# Patient Record
Sex: FEMALE | URBAN - METROPOLITAN AREA
[De-identification: names, ages, dates, MRNs, and addresses within clinical notes are randomized per-mention and may not be internally consistent; named-entity substitution may affect disease eponyms.]

---

## 2017-07-22 ENCOUNTER — IMPORTED ENCOUNTER (OUTPATIENT)
Dept: URBAN - METROPOLITAN AREA CLINIC 1 | Facility: CLINIC | Age: 32
End: 2017-07-22

## 2017-07-22 PROBLEM — Z79.84: Noted: 2017-07-22

## 2017-07-22 PROBLEM — H40.1132: Noted: 2017-07-22

## 2017-07-22 PROBLEM — E11.9: Noted: 2017-07-22

## 2017-07-22 PROBLEM — H25.813: Noted: 2017-07-22

## 2017-07-22 PROCEDURE — 92014 COMPRE OPH EXAM EST PT 1/>: CPT

## 2017-07-22 NOTE — PATIENT DISCUSSION
1.  DM Type II (Oral Meds) without sign of diabetic retinopathy and no blot heme on dilated retinal examination today OU No Macular Edema:  Discussed the pathophysiology of diabetes and its effect on the eye and risk of blindness. Stressed the importance of strong glucose control. Advised of importance of at least yearly dilated examinations but to contact us immediately for any problems or concerns. 2. Cataract OU: Observe for now without intervention. The patient was advised to contact us if any change or worsening of vision3. Moderate Open Angle Glaucoma OU (0.80/0.75) - IOP stable. Cont Travatan Z QHS OU. OCT again unable today. Patient to continue with current gtt regimen. Patient advised to be compliant with gtts. Letter to PCP Return for an appointment in 6 mo 10 with Dr. Kati Wilson.

## 2018-01-20 ENCOUNTER — IMPORTED ENCOUNTER (OUTPATIENT)
Dept: URBAN - METROPOLITAN AREA CLINIC 1 | Facility: CLINIC | Age: 33
End: 2018-01-20

## 2018-01-20 PROBLEM — H25.813: Noted: 2018-01-20

## 2018-01-20 PROBLEM — E11.9: Noted: 2018-01-20

## 2018-01-20 PROBLEM — Z79.84: Noted: 2018-01-20

## 2018-01-20 PROBLEM — H40.1132: Noted: 2018-01-20

## 2018-01-20 PROCEDURE — 92012 INTRM OPH EXAM EST PATIENT: CPT

## 2018-01-20 NOTE — PATIENT DISCUSSION
1.  Moderate Open Angle Glaucoma OU (0.80/0.75) - Increased IOP OU w/ non compliance. Risk of blindness w/ non compliance discussed w/ pt pt expressed understanding. If non compliance still an issue at next visit consider surgical options. Restart Travatan Z QHS OU (sample given.) Patient to continue with current gtt regimen. Patient advised to be compliant with gtts. 2. Cataract OU: Observe for now without intervention. The patient was advised to contact us if any change or worsening of vision3. H/o DM w/o DR Lady Zamora. Return for an appointment for a 30/OCT in June with Dr. Kiel Sierra.

## 2018-01-20 NOTE — PATIENT DISCUSSION
Moderate Open Angle Glaucoma OU -Patient to continue with current gtt regimen. Patient advised to be compliant with gtts. Condition was discussed with patient and patient understands. Will continue to monitor patient for any progression in condition. Patient was advised to call us with any problems questions or concerns.

## 2018-07-26 ENCOUNTER — IMPORTED ENCOUNTER (OUTPATIENT)
Dept: URBAN - METROPOLITAN AREA CLINIC 1 | Facility: CLINIC | Age: 33
End: 2018-07-26

## 2018-07-26 PROBLEM — E11.9: Noted: 2018-07-26

## 2018-07-26 PROBLEM — Z79.84: Noted: 2018-07-26

## 2018-07-26 PROBLEM — H40.1132: Noted: 2018-07-26

## 2018-07-26 PROBLEM — H25.813: Noted: 2018-07-26

## 2018-07-26 PROCEDURE — 92015 DETERMINE REFRACTIVE STATE: CPT

## 2018-07-26 PROCEDURE — 92014 COMPRE OPH EXAM EST PT 1/>: CPT

## 2018-07-26 PROCEDURE — 92133 CPTRZD OPH DX IMG PST SGM ON: CPT

## 2018-07-26 NOTE — PATIENT DISCUSSION
1.  DM Type II (Oral Med) without sign of diabetic retinopathy and no blot heme on dilated retinal examination today OU No Macular Edema:  Discussed the pathophysiology of diabetes and its effect on the eye and risk of blindness. Stressed the importance of strong glucose control. Advised of importance of at least yearly dilated examinations but to contact us immediately for any problems or concerns. 2. Moderate Open Angle Glaucoma OU (0.80/0.7) - IOP stable with compliance. OCT shows slight progression OU but testing today is on newer machine. Cont Travatan Z QHS OU (Sample given x1 refill sent to patient's pharm) 3. Cataract OU: Observe for now without intervention. The patient was advised to contact us if any change or worsening of visionLetter to [address] for an appointment in 6 mo 10 VF 24-2 OU with Dr. Rony Muniz.

## 2019-04-04 ENCOUNTER — IMPORTED ENCOUNTER (OUTPATIENT)
Dept: URBAN - METROPOLITAN AREA CLINIC 1 | Facility: CLINIC | Age: 34
End: 2019-04-04

## 2019-04-04 PROBLEM — H40.1132: Noted: 2019-04-04

## 2019-04-04 PROCEDURE — 92083 EXTENDED VISUAL FIELD XM: CPT

## 2019-04-04 PROCEDURE — 99213 OFFICE O/P EST LOW 20 MIN: CPT

## 2019-04-04 NOTE — PATIENT DISCUSSION
1.  Moderate Open Angle Glaucoma OU (CD 0.80/0.75) - HVF shows non specific defect OU. IOP stable cont Travatan Z QHS OU. Patient advised to be compliant with gtts. 2.  Cataract OU: Observe for now without intervention. The patient was advised to contact us if any change or worsening of vision3. H/o DM w/o DR Ar Roberts for an appointment in 6 months 30/OCT with Dr. Matias Garcia.

## 2019-04-04 NOTE — PATIENT DISCUSSION
Cataract OU: Observe for now without intervention. The patient was advised to contact us if any change or worsening of vision3. H/o DM w/o DR Edward Martinez for an appointment in 6 months 30/OCT with Dr. Sharmila Norris.

## 2019-09-11 ENCOUNTER — IMPORTED ENCOUNTER (OUTPATIENT)
Dept: URBAN - METROPOLITAN AREA CLINIC 1 | Facility: CLINIC | Age: 34
End: 2019-09-11

## 2019-09-11 PROBLEM — H04.123: Noted: 2019-09-11

## 2019-09-11 PROBLEM — H16.143: Noted: 2019-09-11

## 2019-09-11 PROBLEM — H10.213: Noted: 2019-09-11

## 2019-09-11 PROCEDURE — 92012 INTRM OPH EXAM EST PATIENT: CPT

## 2019-09-11 NOTE — PATIENT DISCUSSION
1.  Chemical Conjunctivitis OU -- Secondary to contact with cleaning product / disinfectant. Begin Pred BID OU (Erx'd). 2. CHANDLER w/ PEK OU -- Recommend the use of ATs BID OU  (Sample of Blink given). 3. Moderate Open Angle Glaucoma OU (CD 0.80/0.75) -  Cont Travatan Z QHS OU (sample given today). Patient advised to be compliant with gtts. 4.  Cataract OU -- Observe for now without intervention. The patient was advised to contact us if any change or worsening of vision5. H/o DM w/o  OU.

## 2019-09-21 ENCOUNTER — IMPORTED ENCOUNTER (OUTPATIENT)
Dept: URBAN - METROPOLITAN AREA CLINIC 1 | Facility: CLINIC | Age: 34
End: 2019-09-21

## 2019-09-21 PROCEDURE — 99213 OFFICE O/P EST LOW 20 MIN: CPT

## 2019-09-21 NOTE — PATIENT DISCUSSION
1.  Chemical Conjunctivitis OU -- Resolved DC Pred BID OU  2. CHANDLER w/ PEK OU -- Recommend the use of ATs BID OU  (Sample of Blink given). 3. Moderate Open Angle Glaucoma OU (CD 0.80/0.75) -  Cont Travatan Z QHS OU (sample given today). Patient advised to be compliant with gtts. 4.  Cataract OU -- Observe for now without intervention. The patient was advised to contact us if any change or worsening of vision5. H/o DM w/o  OU. Return for an appointment for Return as scheduled 30/OCT with Dr. Teresa Messina.

## 2022-04-02 ASSESSMENT — TONOMETRY
OS_IOP_MMHG: 12
OS_IOP_MMHG: 17
OD_IOP_MMHG: 16
OD_IOP_MMHG: 14
OS_IOP_MMHG: 14
OD_IOP_MMHG: 14
OS_IOP_MMHG: 15
OD_IOP_MMHG: 14

## 2022-04-02 ASSESSMENT — VISUAL ACUITY
OD_SC: 20/20-2
OD_SC: 20/20-1
OD_SC: 20/20
OS_SC: 20/25
OS_SC: 20/25
OS_SC: 20/20-1
OD_SC: 20/25
OS_SC: 20/25
OS_SC: 20/20
OD_SC: 20/25
OD_SC: 20/20
OS_SC: 20/25